# Patient Record
Sex: MALE | Race: WHITE
[De-identification: names, ages, dates, MRNs, and addresses within clinical notes are randomized per-mention and may not be internally consistent; named-entity substitution may affect disease eponyms.]

---

## 2017-03-13 ENCOUNTER — HOSPITAL ENCOUNTER (EMERGENCY)
Dept: HOSPITAL 26 - MED | Age: 47
Discharge: HOME | End: 2017-03-13
Payer: SELF-PAY

## 2017-03-13 VITALS — DIASTOLIC BLOOD PRESSURE: 79 MMHG | SYSTOLIC BLOOD PRESSURE: 133 MMHG

## 2017-03-13 VITALS — HEIGHT: 69 IN | BODY MASS INDEX: 29.62 KG/M2 | WEIGHT: 200 LBS

## 2017-03-13 VITALS — SYSTOLIC BLOOD PRESSURE: 133 MMHG | DIASTOLIC BLOOD PRESSURE: 79 MMHG

## 2017-03-13 DIAGNOSIS — S00.262A: Primary | ICD-10-CM
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
## 2017-03-13 DIAGNOSIS — S50.362A: ICD-10-CM

## 2017-03-13 DIAGNOSIS — J06.9: ICD-10-CM
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
## 2017-03-13 NOTE — NUR
PATIENT PRESENTS TO ED WITH BUG BITES TO BODY X1 DAY AND COUGH X3 WEEKS; DENIES 
N/V/D; SKIN IS PINK/WARM/DRY; AAOX4 WITH EVEN AND STEADY GAIT; LUNGS CLEAR BL; 
HR EVEN AND REGULAR; PT DENIES ANY FEVER, CP, OR SOB AT THIS TIME; PATIENT 
STATES PAIN OF 0/10 AT THIS TIME; VSS; PATIENT POSITIONED FOR COMFORT; HOB 
ELEVATED; BEDRAILS UP X2; BED DOWN. ER MD MADE AWARE OF PT STATUS.

## 2021-01-23 ENCOUNTER — HOSPITAL ENCOUNTER (EMERGENCY)
Dept: HOSPITAL 26 - MED | Age: 51
Discharge: HOME | End: 2021-01-23
Payer: MEDICAID

## 2021-01-23 VITALS — BODY MASS INDEX: 29.62 KG/M2 | HEIGHT: 69 IN | WEIGHT: 200 LBS

## 2021-01-23 VITALS — DIASTOLIC BLOOD PRESSURE: 71 MMHG | SYSTOLIC BLOOD PRESSURE: 118 MMHG

## 2021-01-23 VITALS — SYSTOLIC BLOOD PRESSURE: 118 MMHG | DIASTOLIC BLOOD PRESSURE: 71 MMHG

## 2021-01-23 DIAGNOSIS — J45.909: ICD-10-CM

## 2021-01-23 DIAGNOSIS — L03.115: Primary | ICD-10-CM

## 2021-01-23 PROCEDURE — 96374 THER/PROPH/DIAG INJ IV PUSH: CPT

## 2021-01-23 PROCEDURE — 99284 EMERGENCY DEPT VISIT MOD MDM: CPT

## 2021-01-23 PROCEDURE — 93971 EXTREMITY STUDY: CPT

## 2021-01-23 NOTE — NUR
-------------------------------------------------------------------------------

           *** Note undone in [facility] - 01/23/21 at 1934 by [email or web address] ***            

-------------------------------------------------------------------------------

REPORT RECEIVED FROM BELLA ARGUETA FOR CONTINUITY OF CARE

## 2021-01-23 NOTE — NUR
49 Y/O MALE. C/O RIGHT LOWER LEG  PAIN, REDNESS & SWELLING  X 4 DAYS. PT IS A 
[employer] WORK[employer] AND WAS WORKING UNDER A HOUSE 4 DAYS AGO. LEG BECAME RED, 
SWOLLEN, AND ITCHY.PT SCRATCHED HIS LEG AND NOW HAS OPEN ABRASION ON RIGHT 
LOWER LEG, TOP OF RIGHT FOOT, AND RIGHT ANKLE.  PEDAL PULSES EQUAL BILATELLY 
+2, <3 SECONDS CAP REFILL. 

PT IS A&O x4, NORMAL RESPIRATIONS, BED LOCKED AND IN LOWEST POSTION. PT STATES 
HE DOES NOT KNOW LAST TETANUS SHOT. 



NKA



PMH: ASTHMA